# Patient Record
Sex: MALE | Race: BLACK OR AFRICAN AMERICAN | NOT HISPANIC OR LATINO
[De-identification: names, ages, dates, MRNs, and addresses within clinical notes are randomized per-mention and may not be internally consistent; named-entity substitution may affect disease eponyms.]

---

## 2023-05-09 ENCOUNTER — RESULT REVIEW (OUTPATIENT)
Age: 63
End: 2023-05-09

## 2023-05-09 ENCOUNTER — APPOINTMENT (OUTPATIENT)
Dept: SPINE | Facility: CLINIC | Age: 63
End: 2023-05-09
Payer: COMMERCIAL

## 2023-05-09 ENCOUNTER — NON-APPOINTMENT (OUTPATIENT)
Age: 63
End: 2023-05-09

## 2023-05-09 ENCOUNTER — OUTPATIENT (OUTPATIENT)
Dept: OUTPATIENT SERVICES | Facility: HOSPITAL | Age: 63
LOS: 1 days | End: 2023-05-09
Payer: COMMERCIAL

## 2023-05-09 VITALS
RESPIRATION RATE: 18 BRPM | DIASTOLIC BLOOD PRESSURE: 95 MMHG | BODY MASS INDEX: 34.01 KG/M2 | OXYGEN SATURATION: 98 % | HEIGHT: 74 IN | HEART RATE: 73 BPM | SYSTOLIC BLOOD PRESSURE: 150 MMHG | WEIGHT: 265 LBS

## 2023-05-09 DIAGNOSIS — M48.061 SPINAL STENOSIS, LUMBAR REGION WITHOUT NEUROGENIC CLAUDICATION: ICD-10-CM

## 2023-05-09 DIAGNOSIS — Z86.79 PERSONAL HISTORY OF OTHER DISEASES OF THE CIRCULATORY SYSTEM: ICD-10-CM

## 2023-05-09 DIAGNOSIS — I10 ESSENTIAL (PRIMARY) HYPERTENSION: ICD-10-CM

## 2023-05-09 DIAGNOSIS — G83.4 CAUDA EQUINA SYNDROME: ICD-10-CM

## 2023-05-09 PROBLEM — Z00.00 ENCOUNTER FOR PREVENTIVE HEALTH EXAMINATION: Status: ACTIVE | Noted: 2023-05-09

## 2023-05-09 PROCEDURE — 99204 OFFICE O/P NEW MOD 45 MIN: CPT

## 2023-05-09 PROCEDURE — 72114 X-RAY EXAM L-S SPINE BENDING: CPT

## 2023-05-09 PROCEDURE — 72114 X-RAY EXAM L-S SPINE BENDING: CPT | Mod: 26

## 2023-05-09 RX ORDER — APIXABAN 5 MG/1
TABLET, FILM COATED ORAL
Refills: 0 | Status: ACTIVE | COMMUNITY

## 2023-05-09 NOTE — HISTORY OF PRESENT ILLNESS
[de-identified] : 63 yo M with PMH of darci (on Eliquis) reports acute on chronic lower back pain for one month without injury.\par As per patient, he has been diagnosed lumbar stenosis since 2016 and has been tried conservative management (PT, exercise) with good improvement.\par However, he noticed significantly worsening lower back pain past one month which is described as intermittent moderate to severe dull pain on his lower back radiating to L inner thigh worsening by prolonged sitting and alleviated by Tylenol. He continues to get PT and exercise without improvement at this time. He denies BB dysfunction, sexual dysfunction, LE weakness, saddle anesthesia.\par He ambulates without assistive device.

## 2023-05-09 NOTE — ASSESSMENT
[FreeTextEntry1] : trace of L hip flex weakness otherwise unremarkable neuro exam.\par \par PLAN\par - Xray L-spine 6 views today\par - MRI L-spine wo\par - continue PT\par - f/u after images to review and further treatment plan